# Patient Record
Sex: MALE | Race: WHITE | URBAN - NONMETROPOLITAN AREA
[De-identification: names, ages, dates, MRNs, and addresses within clinical notes are randomized per-mention and may not be internally consistent; named-entity substitution may affect disease eponyms.]

---

## 2023-05-11 ENCOUNTER — OFFICE VISIT (OUTPATIENT)
Dept: URBAN - NONMETROPOLITAN AREA CLINIC 6 | Facility: CLINIC | Age: 63
End: 2023-05-11
Payer: COMMERCIAL

## 2023-05-11 DIAGNOSIS — H25.13 AGE-RELATED NUCLEAR CATARACT, BILATERAL: Primary | ICD-10-CM

## 2023-05-11 PROCEDURE — 99204 OFFICE O/P NEW MOD 45 MIN: CPT | Performed by: OPHTHALMOLOGY

## 2023-05-11 ASSESSMENT — INTRAOCULAR PRESSURE
OS: 16
OD: 14

## 2023-05-11 ASSESSMENT — KERATOMETRY
OS: 42.72
OD: 43.19

## 2023-05-11 ASSESSMENT — VISUAL ACUITY
OS: 20/30
OD: 20/60

## 2023-05-30 ENCOUNTER — TESTING ONLY (OUTPATIENT)
Dept: URBAN - NONMETROPOLITAN AREA CLINIC 6 | Facility: CLINIC | Age: 63
End: 2023-05-30
Payer: COMMERCIAL

## 2023-05-30 DIAGNOSIS — H25.12 AGE-RELATED NUCLEAR CATARACT, LEFT EYE: Primary | ICD-10-CM

## 2023-06-14 ENCOUNTER — SURGERY (OUTPATIENT)
Dept: URBAN - NONMETROPOLITAN AREA SURGERY 1 | Facility: SURGERY | Age: 63
End: 2023-06-14
Payer: COMMERCIAL

## 2023-06-14 DIAGNOSIS — H25.13 AGE-RELATED NUCLEAR CATARACT, BILATERAL: Primary | ICD-10-CM

## 2023-06-14 PROCEDURE — 66984 XCAPSL CTRC RMVL W/O ECP: CPT | Performed by: OPHTHALMOLOGY

## 2023-06-14 PROCEDURE — PR4CP PR4CP: CUSTOM | Performed by: OPHTHALMOLOGY

## 2023-06-15 ENCOUNTER — POST-OPERATIVE VISIT (OUTPATIENT)
Dept: URBAN - NONMETROPOLITAN AREA CLINIC 6 | Facility: CLINIC | Age: 63
End: 2023-06-15
Payer: COMMERCIAL

## 2023-06-15 DIAGNOSIS — Z48.810 ENCOUNTER FOR SURGICAL AFTERCARE FOLLOWING SURGERY ON A SENSE ORGAN: Primary | ICD-10-CM

## 2023-06-15 PROCEDURE — 99024 POSTOP FOLLOW-UP VISIT: CPT | Performed by: OPTOMETRIST

## 2023-06-15 ASSESSMENT — INTRAOCULAR PRESSURE
OD: 14
OS: 13

## 2023-06-15 NOTE — IMPRESSION/PLAN
Impression: S/P Cataract Extraction by phacoemulsification with IOL placement; ORA OD - 1 Day. Encounter for surgical aftercare following surgery on a sense organ  Z48.810. Plan: Reviewed post op medications and instructions given to the patient. Do not rub operated eye. Wear eye shield for 1 week when sleeping. No lifting over 20lbs for 1 week. Advised patient to use artificial tears for comfort as needed. Call if any problems develop. **Continue Pred Ace gtts TID until next visit.

## 2023-06-21 ENCOUNTER — POST-OPERATIVE VISIT (OUTPATIENT)
Dept: URBAN - NONMETROPOLITAN AREA CLINIC 6 | Facility: CLINIC | Age: 63
End: 2023-06-21
Payer: COMMERCIAL

## 2023-06-21 DIAGNOSIS — Z48.810 ENCOUNTER FOR SURGICAL AFTERCARE FOLLOWING SURGERY ON A SENSE ORGAN: Primary | ICD-10-CM

## 2023-06-21 PROCEDURE — 99024 POSTOP FOLLOW-UP VISIT: CPT | Performed by: OPTOMETRIST

## 2023-06-21 ASSESSMENT — INTRAOCULAR PRESSURE
OS: 15
OD: 15

## 2023-06-21 ASSESSMENT — VISUAL ACUITY: OD: 20/30

## 2023-06-21 NOTE — IMPRESSION/PLAN
Impression: S/P Cataract Extraction by phacoemulsification with IOL placement; ORA OD - 7 Days. Encounter for surgical aftercare following surgery on a sense organ  Z48.810. Plan: No restrictions until after second eye surgery. The patient is not ready to proceed with 2nd eye. He wont have insurance after the end of the month. Call if changes occur. Continue Pred as Rx'd.

## 2023-07-12 ENCOUNTER — POST-OPERATIVE VISIT (OUTPATIENT)
Dept: URBAN - NONMETROPOLITAN AREA CLINIC 6 | Facility: CLINIC | Age: 63
End: 2023-07-12
Payer: COMMERCIAL

## 2023-07-12 DIAGNOSIS — H52.13 MYOPIA, BILATERAL: ICD-10-CM

## 2023-07-12 DIAGNOSIS — Z48.810 ENCOUNTER FOR SURGICAL AFTERCARE FOLLOWING SURGERY ON A SENSE ORGAN: Primary | ICD-10-CM

## 2023-07-12 PROCEDURE — 99024 POSTOP FOLLOW-UP VISIT: CPT | Performed by: OPTOMETRIST

## 2023-07-12 ASSESSMENT — VISUAL ACUITY
OD: 20/30
OS: 20/30

## 2023-07-12 ASSESSMENT — INTRAOCULAR PRESSURE: OD: 17

## 2023-07-12 NOTE — IMPRESSION/PLAN
Impression: S/P Cataract Extraction by phacoemulsification with IOL placement; ORA OD - 28 Days. Encounter for surgical aftercare following surgery on a sense organ  Z48.810. Plan: Discussed adjustment period. Glasses Rx dispensed to patient today. Return to clinic if any changes occur. Monitor OS cataract. Continue Pred Ace TID OD x 1 week then d/c.